# Patient Record
Sex: MALE | Race: WHITE | NOT HISPANIC OR LATINO | Employment: OTHER | URBAN - NONMETROPOLITAN AREA
[De-identification: names, ages, dates, MRNs, and addresses within clinical notes are randomized per-mention and may not be internally consistent; named-entity substitution may affect disease eponyms.]

---

## 2017-09-12 ENCOUNTER — OFFICE VISIT (OUTPATIENT)
Dept: PULMONOLOGY | Facility: CLINIC | Age: 47
End: 2017-09-12

## 2017-09-12 VITALS
WEIGHT: 218 LBS | HEIGHT: 65 IN | TEMPERATURE: 98.3 F | DIASTOLIC BLOOD PRESSURE: 82 MMHG | OXYGEN SATURATION: 98 % | HEART RATE: 80 BPM | BODY MASS INDEX: 36.32 KG/M2 | SYSTOLIC BLOOD PRESSURE: 140 MMHG

## 2017-09-12 DIAGNOSIS — R06.02 SHORTNESS OF BREATH: ICD-10-CM

## 2017-09-12 DIAGNOSIS — G47.33 OSA (OBSTRUCTIVE SLEEP APNEA): ICD-10-CM

## 2017-09-12 DIAGNOSIS — R91.1 LUNG NODULE: Primary | ICD-10-CM

## 2017-09-12 PROCEDURE — 99204 OFFICE O/P NEW MOD 45 MIN: CPT | Performed by: INTERNAL MEDICINE

## 2017-09-12 RX ORDER — HYDROCODONE BITARTRATE AND ACETAMINOPHEN 7.5; 325 MG/1; MG/1
TABLET ORAL
Refills: 0 | COMMUNITY
Start: 2017-08-20

## 2017-09-12 RX ORDER — AMLODIPINE BESYLATE 5 MG/1
TABLET ORAL
COMMUNITY
Start: 2017-09-06

## 2017-09-12 RX ORDER — LEVOTHYROXINE SODIUM 0.03 MG/1
TABLET ORAL
COMMUNITY
Start: 2017-09-06

## 2017-09-12 NOTE — PROGRESS NOTES
Subjective   Jean Paul Canseco is a 46 y.o. male who is being seen for Abnormal CT    History of Present Illness   This 46-year-old gentleman has been deferred to us by his primary care provider for evaluation of an abnormal CT scan of the chest.  Patient is accompanied by his girlfriend who also participated in the discussion.  Girlfriend tells me that for the past 5 - 6 months patient was sleeping all the day and that concerned her.  Patient tells me that he is sleepy but more than that he is fatigued and tired throughout the day.  He talk to his primary care provider about this issues and workup was initiated.  Initially chest x-ray was done with other workup, chest x-ray revealed nodular densities and hence he was sent for a CT scan of the chest which confirmed it.  He was then sent to us for further evaluation and management.  Patient denies any weight loss or change in his appetite.  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  Past Medical History:   Diagnosis Date   • Black lung    • Hypertension    • Thyroid disease      Past Surgical History:   Procedure Laterality Date   • COSMETIC SURGERY     • ELBOW ARTHROPLASTY     • FINGER SURGERY     • LEG SURGERY       Family History   Problem Relation Age of Onset   • Hypertension Mother    • Diabetes Mother    • Heart disease Mother       reports that he has never smoked. He has never used smokeless tobacco. He reports that he does not drink alcohol or use illicit drugs.  No Known Allergies      The following portions of the patient's history were reviewed and updated as appropriate: allergies, current medications, past family history, past medical history, past social history, past surgical history and problem list.    Review of Systems   Constitutional: Positive for fatigue ( Wife states he is sleeping all the time.). Negative for appetite change, chills, diaphoresis and unexpected weight change.        Loud snoring,  "witnessed apnea by girlfriend     HENT: Negative for sore throat, trouble swallowing and voice change.    Eyes: Negative for visual disturbance.   Respiratory: Positive for cough, shortness of breath and wheezing. Negative for apnea and choking.    Cardiovascular: Negative for chest pain, palpitations and leg swelling.   Gastrointestinal: Negative for abdominal pain, constipation, diarrhea, nausea and vomiting.   Endocrine: Negative for cold intolerance, heat intolerance, polydipsia, polyphagia and polyuria.   Genitourinary: Negative for difficulty urinating and dysuria.   Musculoskeletal: Positive for back pain (Pain in and around shoulder blades. ). Negative for gait problem.   Skin: Negative for rash and wound.   Neurological: Negative for syncope and light-headedness.   Hematological: Negative for adenopathy.   Psychiatric/Behavioral: Positive for sleep disturbance. Negative for agitation, behavioral problems and confusion.   All other systems reviewed and are negative.      Objective   /82 (BP Location: Left arm, Patient Position: Sitting, Cuff Size: Adult)  Pulse 80  Temp 98.3 °F (36.8 °C) (Oral)   Ht 65\" (165.1 cm)  Wt 218 lb (98.9 kg)  SpO2 98%  BMI 36.28 kg/m2  Physical Exam   Constitutional: He is oriented to person, place, and time.   HENT:   Head: Normocephalic and atraumatic.   Nose: Mucosal edema present.   Crowded oropharynx, Mallampati score 3     Eyes: EOM are normal. Pupils are equal, round, and reactive to light.   Neck: Neck supple.   Cardiovascular: Normal rate, regular rhythm and normal heart sounds.    Pulmonary/Chest: He has rhonchi.   Vesicular breath sound bilaterally with prolonged expiratory phase   Abdominal: Soft. Bowel sounds are normal.   Musculoskeletal: Normal range of motion. He exhibits no deformity.   Neurological: He is alert and oriented to person, place, and time.   Skin: Skin is warm and dry.   Psychiatric: He has a normal mood and affect. His behavior is normal. "   Nursing note and vitals reviewed.        Radiology:  No Images in the past 120 days found..    Lab Results:  No results found for any previous visit.    Assessment      ICD-10-CM ICD-9-CM   1. Lung nodule R91.1 793.11   2. ALEJANDRO (obstructive sleep apnea) G47.33 327.23                DISCUSSION:  This gentleman presents to us with an abnormal CT scan showing multiple lung nodules bilaterally.  Patient is extremely worried about cancer.  I had a long discussion with him.  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  But he tells me that these are new and his weight.  Considering this would proceed with a bronchoscopy for endobronchial look and also to obtain bronchial washing and brushing.  I have told him that our partner Dr. Stanton over perform the procedure at [facility] and I would see him again after the procedure once the pathology/cytology report is available.    Patient also has symptoms consistent with obstructive sleep apnea.  We had good discussion about that.  He would need a nocturnal polysomnography for evaluating that and he was to go for that as quick as possible.  His girlfriend his with him who tells me that he quits breathing and she is worried about that.  Would send him for a nocturnal polysomnography while waiting for his next visit.  He would also benefit from a pulmonary function test for objective assessment of his airways function considering his significant symptoms of exertional dyspnea.    Plan    Orders Placed This Encounter   Procedures   • Ambulatory Referral to Sleep Medicine     No orders of the defined types were placed in this encounter.                 Jax Boone MD, [facility], [facility]  Pulmonary, Critical Care, and Sleep Medicine

## 2017-09-21 ENCOUNTER — HOSPITAL ENCOUNTER (OUTPATIENT)
Facility: HOSPITAL | Age: 47
Setting detail: HOSPITAL OUTPATIENT SURGERY
Discharge: HOME | End: 2017-09-21
Attending: INTERNAL MEDICINE | Admitting: INTERNAL MEDICINE

## 2017-09-21 ENCOUNTER — ANESTHESIA EVENT (OUTPATIENT)
Dept: PERIOP | Facility: HOSPITAL | Age: 47
End: 2017-09-21

## 2017-09-21 ENCOUNTER — ANESTHESIA (OUTPATIENT)
Dept: PERIOP | Facility: HOSPITAL | Age: 47
End: 2017-09-21

## 2017-09-21 VITALS
RESPIRATION RATE: 20 BRPM | HEART RATE: 76 BPM | HEIGHT: 65 IN | WEIGHT: 205 LBS | SYSTOLIC BLOOD PRESSURE: 110 MMHG | BODY MASS INDEX: 34.16 KG/M2 | DIASTOLIC BLOOD PRESSURE: 44 MMHG | OXYGEN SATURATION: 93 % | TEMPERATURE: 98.7 F

## 2017-09-21 DIAGNOSIS — G47.33 OSA (OBSTRUCTIVE SLEEP APNEA): ICD-10-CM

## 2017-09-21 DIAGNOSIS — R91.1 LUNG NODULE: ICD-10-CM

## 2017-09-21 LAB
BASOPHILS # BLD AUTO: 0.06 10*3/MM3 (ref 0–0.3)
BASOPHILS NFR BLD AUTO: 0.7 % (ref 0–2)
DEPRECATED RDW RBC AUTO: 43.8 FL (ref 37–54)
EOSINOPHIL # BLD AUTO: 0.39 10*3/MM3 (ref 0–0.7)
EOSINOPHIL NFR BLD AUTO: 4.2 % (ref 0–5)
ERYTHROCYTE [DISTWIDTH] IN BLOOD BY AUTOMATED COUNT: 13.8 % (ref 11.5–14.5)
HCT VFR BLD AUTO: 44 % (ref 42–52)
HGB BLD-MCNC: 15.1 G/DL (ref 14–18)
IMM GRANULOCYTES # BLD: 0.02 10*3/MM3 (ref 0–0.03)
IMM GRANULOCYTES NFR BLD: 0.2 % (ref 0–0.5)
INR PPP: 1.01 (ref 0.9–1.1)
LYMPHOCYTES # BLD AUTO: 3.91 10*3/MM3 (ref 1–3)
LYMPHOCYTES NFR BLD AUTO: 42.4 % (ref 21–51)
MCH RBC QN AUTO: 30.1 PG (ref 27–33)
MCHC RBC AUTO-ENTMCNC: 34.3 G/DL (ref 33–37)
MCV RBC AUTO: 87.8 FL (ref 80–94)
MONOCYTES # BLD AUTO: 0.63 10*3/MM3 (ref 0.1–0.9)
MONOCYTES NFR BLD AUTO: 6.8 % (ref 0–10)
NEUTROPHILS # BLD AUTO: 4.22 10*3/MM3 (ref 1.4–6.5)
NEUTROPHILS NFR BLD AUTO: 45.7 % (ref 30–70)
PLATELET # BLD AUTO: 308 10*3/MM3 (ref 130–400)
PMV BLD AUTO: 9.5 FL (ref 6–10)
PROTHROMBIN TIME: 13.4 SECONDS (ref 11–15.4)
RBC # BLD AUTO: 5.01 10*6/MM3 (ref 4.7–6.1)
WBC NRBC COR # BLD: 9.23 10*3/MM3 (ref 4.5–12.5)

## 2017-09-21 PROCEDURE — 87102 FUNGUS ISOLATION CULTURE: CPT | Performed by: INTERNAL MEDICINE

## 2017-09-21 PROCEDURE — 25010000002 MIDAZOLAM PER 1 MG: Performed by: NURSE ANESTHETIST, CERTIFIED REGISTERED

## 2017-09-21 PROCEDURE — S0260 H&P FOR SURGERY: HCPCS | Performed by: INTERNAL MEDICINE

## 2017-09-21 PROCEDURE — 25010000002 DEXAMETHASONE PER 1 MG: Performed by: NURSE ANESTHETIST, CERTIFIED REGISTERED

## 2017-09-21 PROCEDURE — 31623 DX BRONCHOSCOPE/BRUSH: CPT | Performed by: INTERNAL MEDICINE

## 2017-09-21 PROCEDURE — 25010000002 ONDANSETRON PER 1 MG: Performed by: NURSE ANESTHETIST, CERTIFIED REGISTERED

## 2017-09-21 PROCEDURE — 25010000002 FENTANYL CITRATE (PF) 100 MCG/2ML SOLUTION: Performed by: NURSE ANESTHETIST, CERTIFIED REGISTERED

## 2017-09-21 PROCEDURE — 87116 MYCOBACTERIA CULTURE: CPT | Performed by: INTERNAL MEDICINE

## 2017-09-21 PROCEDURE — 87070 CULTURE OTHR SPECIMN AEROBIC: CPT | Performed by: INTERNAL MEDICINE

## 2017-09-21 PROCEDURE — 87206 SMEAR FLUORESCENT/ACID STAI: CPT | Performed by: INTERNAL MEDICINE

## 2017-09-21 PROCEDURE — 25010000002 PHENYLEPHRINE PER 1 ML: Performed by: NURSE ANESTHETIST, CERTIFIED REGISTERED

## 2017-09-21 PROCEDURE — 87205 SMEAR GRAM STAIN: CPT | Performed by: INTERNAL MEDICINE

## 2017-09-21 PROCEDURE — 25010000002 PROPOFOL 10 MG/ML EMULSION: Performed by: NURSE ANESTHETIST, CERTIFIED REGISTERED

## 2017-09-21 PROCEDURE — 31624 DX BRONCHOSCOPE/LAVAGE: CPT | Performed by: INTERNAL MEDICINE

## 2017-09-21 PROCEDURE — 85025 COMPLETE CBC W/AUTO DIFF WBC: CPT | Performed by: INTERNAL MEDICINE

## 2017-09-21 PROCEDURE — 31652 BRONCH EBUS SAMPLNG 1/2 NODE: CPT | Performed by: INTERNAL MEDICINE

## 2017-09-21 PROCEDURE — 85610 PROTHROMBIN TIME: CPT | Performed by: INTERNAL MEDICINE

## 2017-09-21 RX ORDER — FENTANYL CITRATE 50 UG/ML
INJECTION, SOLUTION INTRAMUSCULAR; INTRAVENOUS AS NEEDED
Status: DISCONTINUED | OUTPATIENT
Start: 2017-09-21 | End: 2017-09-21 | Stop reason: SURG

## 2017-09-21 RX ORDER — IPRATROPIUM BROMIDE AND ALBUTEROL SULFATE 2.5; .5 MG/3ML; MG/3ML
3 SOLUTION RESPIRATORY (INHALATION) ONCE AS NEEDED
Status: DISCONTINUED | OUTPATIENT
Start: 2017-09-21 | End: 2017-09-21 | Stop reason: HOSPADM

## 2017-09-21 RX ORDER — FENTANYL CITRATE 50 UG/ML
50 INJECTION, SOLUTION INTRAMUSCULAR; INTRAVENOUS
Status: DISCONTINUED | OUTPATIENT
Start: 2017-09-21 | End: 2017-09-21 | Stop reason: HOSPADM

## 2017-09-21 RX ORDER — SODIUM CHLORIDE, SODIUM LACTATE, POTASSIUM CHLORIDE, CALCIUM CHLORIDE 600; 310; 30; 20 MG/100ML; MG/100ML; MG/100ML; MG/100ML
125 INJECTION, SOLUTION INTRAVENOUS CONTINUOUS
Status: DISCONTINUED | OUTPATIENT
Start: 2017-09-21 | End: 2017-09-21 | Stop reason: HOSPADM

## 2017-09-21 RX ORDER — MIDAZOLAM HYDROCHLORIDE 1 MG/ML
INJECTION INTRAMUSCULAR; INTRAVENOUS AS NEEDED
Status: DISCONTINUED | OUTPATIENT
Start: 2017-09-21 | End: 2017-09-21 | Stop reason: SURG

## 2017-09-21 RX ORDER — ONDANSETRON 2 MG/ML
4 INJECTION INTRAMUSCULAR; INTRAVENOUS ONCE AS NEEDED
Status: DISCONTINUED | OUTPATIENT
Start: 2017-09-21 | End: 2017-09-21 | Stop reason: HOSPADM

## 2017-09-21 RX ORDER — LIDOCAINE HYDROCHLORIDE 20 MG/ML
INJECTION, SOLUTION INFILTRATION; PERINEURAL AS NEEDED
Status: DISCONTINUED | OUTPATIENT
Start: 2017-09-21 | End: 2017-09-21 | Stop reason: SURG

## 2017-09-21 RX ORDER — SODIUM CHLORIDE 0.9 % (FLUSH) 0.9 %
1-10 SYRINGE (ML) INJECTION AS NEEDED
Status: DISCONTINUED | OUTPATIENT
Start: 2017-09-21 | End: 2017-09-21 | Stop reason: HOSPADM

## 2017-09-21 RX ORDER — DEXAMETHASONE SODIUM PHOSPHATE 4 MG/ML
INJECTION, SOLUTION INTRA-ARTICULAR; INTRALESIONAL; INTRAMUSCULAR; INTRAVENOUS; SOFT TISSUE AS NEEDED
Status: DISCONTINUED | OUTPATIENT
Start: 2017-09-21 | End: 2017-09-21 | Stop reason: SURG

## 2017-09-21 RX ORDER — ONDANSETRON 2 MG/ML
INJECTION INTRAMUSCULAR; INTRAVENOUS AS NEEDED
Status: DISCONTINUED | OUTPATIENT
Start: 2017-09-21 | End: 2017-09-21 | Stop reason: SURG

## 2017-09-21 RX ORDER — PROPOFOL 10 MG/ML
VIAL (ML) INTRAVENOUS AS NEEDED
Status: DISCONTINUED | OUTPATIENT
Start: 2017-09-21 | End: 2017-09-21 | Stop reason: SURG

## 2017-09-21 RX ADMIN — MIDAZOLAM HYDROCHLORIDE 2 MG: 1 INJECTION, SOLUTION INTRAMUSCULAR; INTRAVENOUS at 10:29

## 2017-09-21 RX ADMIN — FENTANYL CITRATE 50 MCG: 50 INJECTION INTRAMUSCULAR; INTRAVENOUS at 10:45

## 2017-09-21 RX ADMIN — LIDOCAINE HYDROCHLORIDE 100 MG: 20 INJECTION, SOLUTION INFILTRATION; PERINEURAL at 10:36

## 2017-09-21 RX ADMIN — SODIUM CHLORIDE, POTASSIUM CHLORIDE, SODIUM LACTATE AND CALCIUM CHLORIDE: 600; 310; 30; 20 INJECTION, SOLUTION INTRAVENOUS at 10:31

## 2017-09-21 RX ADMIN — ONDANSETRON 4 MG: 2 INJECTION, SOLUTION INTRAMUSCULAR; INTRAVENOUS at 10:40

## 2017-09-21 RX ADMIN — DEXAMETHASONE SODIUM PHOSPHATE 4 MG: 4 INJECTION, SOLUTION INTRAMUSCULAR; INTRAVENOUS at 10:40

## 2017-09-21 RX ADMIN — PROPOFOL 200 MG: 10 INJECTION, EMULSION INTRAVENOUS at 10:36

## 2017-09-21 RX ADMIN — FENTANYL CITRATE 50 MCG: 50 INJECTION INTRAMUSCULAR; INTRAVENOUS at 10:36

## 2017-09-21 RX ADMIN — PHENYLEPHRINE HYDROCHLORIDE 100 MCG: 10 INJECTION, SOLUTION INTRAMUSCULAR; INTRAVENOUS; SUBCUTANEOUS at 11:05

## 2017-09-21 NOTE — ANESTHESIA POSTPROCEDURE EVALUATION
Patient: Jean Paul Canseco    Procedure Summary     Date Anesthesia Start Anesthesia Stop Room / Location    09/21/17 1031 1103 [facility] OR 07 / [facility] OR       Procedure Diagnosis Surgeon Provider    BRONCHOSCOPY WITH ENDOBRONCHIAL ULTRASOUND (N/A Bronchus) Lung nodule; ALEJANDRO (obstructive sleep apnea)  (Lung nodule [R91.1]; ALEJANDRO (obstructive sleep apnea) [G47.33]) MD Brandan Barcenas MD          Anesthesia Type: general  Last vitals  BP   92/50 (09/21/17 1110)    Temp   98.7 °F (37.1 °C) (09/21/17 1105)    Pulse   62 (09/21/17 1110)   Resp   20 (09/21/17 1110)    SpO2   93 % (09/21/17 1110)      Post Anesthesia Care and Evaluation    Patient location during evaluation: bedside  Patient participation: complete - patient participated  Level of consciousness: awake and alert  Pain score: 1  Pain management: adequate  Airway patency: patent  Anesthetic complications: No anesthetic complications  PONV Status: none  Cardiovascular status: acceptable  Respiratory status: acceptable  Hydration status: acceptable

## 2017-09-21 NOTE — OP NOTE
Bronchoscopy Procedure Note    Date of Operation: 9/21/2017    Pre-op Diagnosis: Abnormal CT chest with mediastinal lymphadenopathy    Post-op Diagnosis: Abnormal CT chest with mediastinal lymphadenopathy    Surgeon: Wade Stanton MD    Assistants: None    Anesthesia: Please see anesthesia report for details    Operation: Flexible fiberoptic bronchoscopy, diagnostic     Findings: None    Specimen:   Bronchial washings    Bronchial brushings off lingula    Bronchial brushings of right middle lobe    Bronchioloalveolar lavage of lingula    Bronchioloalveolar lavage of right middle lobe    Endobronchial ultrasound-guided transbronchial needle aspiration of station 7 lymph node-twice    Estimated Blood Loss: Minimal    Complications: None    Indications and History:  The patient is a 46 y.o. male with abnormal CT chest with mediastinal lymphadenopathy and bilateral pulmonary nodules.  The risks, benefits, complications, treatment options and expected outcomes were discussed with the patient.  The possibilities of reaction to medication, pulmonary aspiration, perforation of a viscus, bleeding, failure to diagnose a condition and creating a complication requiring transfusion or operation were discussed with the patient who freely signed the consent.      Description of Procedure:  The patient was seen in the Holding Room and the site of surgery properly noted/marked.  The patient was taken to endoscopy suite, identified as Jean Paul Canseco and the procedure verified as Flexible Fiberoptic Bronchoscopy.  A Time Out was held and the above information confirmed.     After the induction of topical nasopharyngeal anesthesia, the patient was positioned  and the bronchoscope was passed through the nares . The vocal cords were visualized and  2 ml 1 % lidocaine was topically placed onto the cords. The cords were normal. The scope was then passed into the trachea.      The scope was passed through the LMA.  The scope was  sequentially passed into the left main and then left upper and lower bronchi and segmental bronchi.       The scope was then withdrawn and advanced into the right main bronchus and then into the RUL, RML, and RLL bronchi and segmental bronchi.     No endo bronchial masses or lesions were seen.  Station 7 lymph node was enlarged close to 1.2 x 1 cm.  Samples-    Bronchial washings    Then the bronchoscope was wedged into the right middle lobe for a bronchioloalveolar lavage and close to 60 cc of saline was given once and close to 15 cc was aspirated back.    Then the bronchoscope was wedged into the lingula for a bronchioloalveolar lavage and close to 60 cc of saline was given once and close to 12 cc was respiratory back.    Bronchial brushings off lingula was done    Bronchial brushings of right middle lobe was done    Then the endobronchial ultrasound was introduced and mediastinal survey was done.    Station 7 lymph node was enlarged.  Endobronchial ultrasound-guided transbronchial needle aspiration of station 7 lymph node was done twice.    Patient had mild bleeding which was controlled with cold saline and local epinephrine.    Samples were sent for cytology and microbiology.  Please follow up the results  The Patient was taken to the Endoscopy Recovery area in satisfactory condition.      Findings were discussed with patient and patient's family.  Wade Stanton MD

## 2017-09-21 NOTE — ANESTHESIA PROCEDURE NOTES
Airway  Urgency: elective    Airway not difficult    General Information and Staff    Patient location during procedure: OR  CRNA: JOHAN TAYLOR    Indications and Patient Condition  Indications for airway management: airway protection    Preoxygenated: yes  MILS maintained throughout  Mask difficulty assessment: 0 - not attempted    Final Airway Details  Final airway type: supraglottic airway      Successful airway: unique  Size 5    Number of attempts at approach: 1    Additional Comments  LMA inserted without difficulty.  Head and neck maintained midline.  Lips and teeth as per preop.

## 2017-09-21 NOTE — H&P
Chief Complaint:  Here for a bronchoscopy for abnormal imaging - ct chest    Ct chest reviewed     Subjective     Interval History: no changes since the last time since was seen in office        Review of Systems:    Review of system system is negative for shortness of breath chest pain lightheadedness dizziness any numbness in any area of the body belly pain nausea vomiting diarrhea shortness of breath cough          Vital Signs  Temp:  [98 °F (36.7 °C)] 98 °F (36.7 °C)  Heart Rate:  [75] 75  Resp:  [20] 20  BP: (153)/(87) 153/87  Body mass index is 34.11 kg/(m^2).  No intake or output data in the 24 hours ending 09/21/17 1025       Physical Exam:   General- normal in appearance, not in any acute distress    HEENT- pupils equally reactive to light, normal in size, no scleral icterus    Neck-supple    Chest-respirations normal-on auscultation no wheezing no crackles    S1 and S2 normal    Abdomen-nontender nondistended bowel sounds positive    CNS-nonfocal    Extremities-no edema    Psychiatric-mood good, good eye contact, alert awake oriented     Results Review:     I reviewed the patient's new clinical results.    Results from last 7 days  Lab Units 09/21/17  0945   WBC 10*3/mm3 9.23   HEMOGLOBIN g/dL 15.1   PLATELETS 10*3/mm3 308         Lab Results   Component Value Date    INR 1.01 09/21/2017    PROTIME 13.4 09/21/2017           Invalid input(s): PROT, LABALBU      Imaging Results (last 24 hours)     ** No results found for the last 24 hours. **                    lactated ringers 125 mL/hr       Medication Review:     Assessment/Plan     Abnormal ct chest- all the medications history physical labs are reviewed.  We will do a bronchoscopy with bronchoalveolar lavage his bronchial brushings.  We'll send it for cytology and microbiology.    Informed consent taken.    Patient Active Problem List   Diagnosis Code   • Lung nodule R91.1   • ALEJANDRO (obstructive sleep apnea) G47.33               Wade Stanton  MD  09/21/17  10:25 AM

## 2017-09-21 NOTE — ANESTHESIA PREPROCEDURE EVALUATION
Anesthesia Evaluation     NPO Solid Status: > 8 hours  NPO Liquid Status: > 8 hours     Airway   Mallampati: II  TM distance: >3 FB  Neck ROM: full  no difficulty expected  Dental    (+) poor dentition    Pulmonary - normal exam   (+) sleep apnea,   Cardiovascular - normal exam    (+) hypertension,       Neuro/Psych  GI/Hepatic/Renal/Endo      Musculoskeletal     Abdominal  - normal exam   Substance History      OB/GYN          Other                                        Anesthesia Plan    ASA 3     general     intravenous induction   Anesthetic plan and risks discussed with patient.

## 2017-09-22 LAB
LAB AP CASE REPORT: NORMAL
Lab: NORMAL

## 2017-09-23 LAB
BACTERIA SPEC RESP CULT: NORMAL
GRAM STN SPEC: NORMAL

## 2017-09-28 ENCOUNTER — OFFICE VISIT (OUTPATIENT)
Dept: PULMONOLOGY | Facility: CLINIC | Age: 47
End: 2017-09-28

## 2017-09-28 VITALS
WEIGHT: 220 LBS | SYSTOLIC BLOOD PRESSURE: 148 MMHG | OXYGEN SATURATION: 98 % | TEMPERATURE: 98.1 F | BODY MASS INDEX: 36.65 KG/M2 | DIASTOLIC BLOOD PRESSURE: 84 MMHG | HEIGHT: 65 IN | HEART RATE: 76 BPM

## 2017-09-28 DIAGNOSIS — G47.33 OSA (OBSTRUCTIVE SLEEP APNEA): ICD-10-CM

## 2017-09-28 DIAGNOSIS — R91.1 LUNG NODULE: Primary | ICD-10-CM

## 2017-09-28 PROCEDURE — 99214 OFFICE O/P EST MOD 30 MIN: CPT | Performed by: INTERNAL MEDICINE

## 2017-09-28 NOTE — PROGRESS NOTES
Subjective   Jean Paul Canseco is a 46 y.o. male who is being seen for Lung Nodule    History of Present Illness   Patient returns after bronchoscopy.  This patient originally presented to us with an abnormal CT scan of the chest showing multiple O'Doul's bilaterally, the largest one was 1.8 cm size in the right lower lobe.  The rest of the nodules were between 2-6 mm in size.  Neoplastic processes was high in our differential ecchymosis, we decided to proceed with a bronchoscopy.  This was done by our partner Dr. Stanton and patient is here to discuss about the results.    Symptom wise he continues to have shortness of breath on mild exertion along with some dry cough.  No change in his symptoms since last visit.  Past Medical History:   Diagnosis Date   • Black lung    • Hypertension    • Thyroid disease      Past Surgical History:   Procedure Laterality Date   • BRONCHOSCOPY N/A 9/21/2017    Procedure: BRONCHOSCOPY WITH ENDOBRONCHIAL ULTRASOUND;  Surgeon: Wade Stanton MD;  Location: [facility];  Service:    • COSMETIC SURGERY     • ELBOW ARTHROPLASTY     • FINGER SURGERY     • LEG SURGERY       Family History   Problem Relation Age of Onset   • Hypertension Mother    • Diabetes Mother    • Heart disease Mother       reports that he has never smoked. He has never used smokeless tobacco. He reports that he does not drink alcohol or use illicit drugs.  No Known Allergies        The following portions of the patient's history were reviewed and updated as appropriate: allergies, current medications, past family history, past medical history, past social history, past surgical history and problem list.    Review of Systems   Constitutional: Negative for appetite change, chills, diaphoresis and unexpected weight change.   HENT: Negative for sore throat, trouble swallowing and voice change.    Eyes: Negative for visual disturbance.   Respiratory: Positive for cough (Occasional) and shortness of breath. Negative for apnea,  "choking and wheezing.    Cardiovascular: Negative for chest pain, palpitations and leg swelling.   Gastrointestinal: Negative for abdominal pain, constipation, diarrhea, nausea and vomiting.   Endocrine: Negative for cold intolerance, heat intolerance, polydipsia, polyphagia and polyuria.   Genitourinary: Negative for difficulty urinating and dysuria.   Musculoskeletal: Negative for gait problem.   Skin: Negative for rash and wound.   Neurological: Negative for syncope and light-headedness.   Hematological: Negative for adenopathy.   Psychiatric/Behavioral: Negative for agitation, behavioral problems and confusion.   All other systems reviewed and are negative.      Objective   /84  Pulse 76  Temp 98.1 °F (36.7 °C) (Oral)   Ht 65\" (165.1 cm)  Wt 220 lb (99.8 kg)  SpO2 98%  BMI 36.61 kg/m2  Physical Exam   Constitutional: He is oriented to person, place, and time.   HENT:   Head: Normocephalic and atraumatic.   Nose: Mucosal edema present.   Eyes: EOM are normal. Pupils are equal, round, and reactive to light.   Neck: Neck supple.   Cardiovascular: Normal rate, regular rhythm and normal heart sounds.    Pulmonary/Chest: He has rhonchi.   Vesicular breath sound bilaterally with prolonged expiratory phase   Abdominal: Soft. Bowel sounds are normal.   Musculoskeletal: Normal range of motion. He exhibits no deformity.   Neurological: He is alert and oriented to person, place, and time.   Skin: Skin is warm and dry.   Psychiatric: He has a normal mood and affect. His behavior is normal.   Nursing note and vitals reviewed.        Radiology:  No Images in the past 120 days found..    Lab Results:  Admission on 09/21/2017, Discharged on 09/21/2017   Component Date Value Ref Range Status   • Protime 09/21/2017 13.4  11.0 - 15.4 Seconds Final   • INR 09/21/2017 1.01  0.90 - 1.10 Final   • WBC 09/21/2017 9.23  4.50 - 12.50 10*3/mm3 Final   • RBC 09/21/2017 5.01  4.70 - 6.10 10*6/mm3 Final   • Hemoglobin 09/21/2017 " 15.1  14.0 - 18.0 g/dL Final   • Hematocrit 09/21/2017 44.0  42.0 - 52.0 % Final   • MCV 09/21/2017 87.8  80.0 - 94.0 fL Final   • MCH 09/21/2017 30.1  27.0 - 33.0 pg Final   • MCHC 09/21/2017 34.3  33.0 - 37.0 g/dL Final   • RDW 09/21/2017 13.8  11.5 - 14.5 % Final   • RDW-SD 09/21/2017 43.8  37.0 - 54.0 fl Final   • MPV 09/21/2017 9.5  6.0 - 10.0 fL Final   • Platelets 09/21/2017 308  130 - 400 10*3/mm3 Final   • Neutrophil % 09/21/2017 45.7  30.0 - 70.0 % Final   • Lymphocyte % 09/21/2017 42.4  21.0 - 51.0 % Final   • Monocyte % 09/21/2017 6.8  0.0 - 10.0 % Final   • Eosinophil % 09/21/2017 4.2  0.0 - 5.0 % Final   • Basophil % 09/21/2017 0.7  0.0 - 2.0 % Final   • Immature Grans % 09/21/2017 0.2  0.0 - 0.5 % Final   • Neutrophils, Absolute 09/21/2017 4.22  1.40 - 6.50 10*3/mm3 Final   • Lymphocytes, Absolute 09/21/2017 3.91* 1.00 - 3.00 10*3/mm3 Final   • Monocytes, Absolute 09/21/2017 0.63  0.10 - 0.90 10*3/mm3 Final   • Eosinophils, Absolute 09/21/2017 0.39  0.00 - 0.70 10*3/mm3 Final   • Basophils, Absolute 09/21/2017 0.06  0.00 - 0.30 10*3/mm3 Final   • Immature Grans, Absolute 09/21/2017 0.02  0.00 - 0.03 10*3/mm3 Final   • Case Report 09/21/2017    Final                    Value: [facility] Non-Gyn Cytology                           Case: [id number]                                  Authorizing Provider:  Wade Stanton MD         Collected:           09/21/2017 10:43 AM          Ordering Location:     [facility]      Received:            09/21/2017 12:52 PM                                 OPERATING ROOM DEPARTMENT                                                    Pathologist:           Lay Velasco MD                                                         Specimens:   1) - Lung, Right Middle Lobe, RML BAL                                                               2) - Lung, Right Middle Lobe, RML BRUSHING                                                          3) - Lung, Lingula, LINGULA  BAL                                                                     4) - Lung, Lingula, LINGULA BRUSHING                                                                5) - Bronchus, BRONCH WASH                                                                                                    6) - Bronchus, SUBCARINAL NEEDLE ASPIRATE                                                 • Fungus Stain 09/21/2017 Final report   Preliminary   • KOH/Calcofluor preparation 09/21/2017 Comment   Preliminary   • Fungus Stain 09/21/2017 Final report   Preliminary   • KOH/Calcofluor preparation 09/21/2017 Comment   Preliminary   • Fungus Stain 09/21/2017 Final report   Preliminary   • KOH/Calcofluor preparation 09/21/2017 Comment   Preliminary   • AFB Specimen Processing 09/21/2017 Concentration   Preliminary   • Acid Fast Smear 09/21/2017 Negative   Preliminary   • AFB Specimen Processing 09/21/2017 Concentration   Preliminary   • Acid Fast Smear 09/21/2017 Negative   Preliminary   • AFB Specimen Processing 09/21/2017 Concentration   Preliminary   • Acid Fast Smear 09/21/2017 Negative   Preliminary   • Respiratory Culture 09/21/2017 Light growth (2+) Normal Respiratory Anamika   Final   • Gram Stain Result 09/21/2017 Few (2+) WBCs seen   Final   • Gram Stain Result 09/21/2017 Rare (1+) Gram positive cocci in pairs, chains and clusters   Final   • Gram Stain Result 09/21/2017 Rare (1+) Gram positive bacilli   Final   • Gram Stain Result 09/21/2017 Rare (1+) Gram negative bacilli   Final   • Respiratory Culture 09/21/2017 Scant growth (1+) Normal Respiratory Anamika   Final   • Gram Stain Result 09/21/2017 Rare (1+) WBCs seen   Final   • Gram Stain Result 09/21/2017 No organisms seen   Final   • Respiratory Culture 09/21/2017 Light growth (2+) Normal Respiratory Anamika   Final   • Gram Stain Result 09/21/2017 Few (2+) WBCs seen   Final   • Gram Stain Result 09/21/2017 Rare (1+) Gram positive cocci in pairs, chains and clusters    Final       Assessment      ICD-10-CM ICD-9-CM   1. Lung nodule R91.1 793.11   2. ALEJANDRO (obstructive sleep apnea) G47.33 327.23                DISCUSSION:  I have reviewed the bronchoscopy report from Dr. Stanton and also reviewed the pathologist a cytology report.  No other endobronchial lesion was noted.  Bronchoalveolar lavage, bronchial brushing and bronchial washing from right middle lobe as well as lingula was negative for malignant cells.  Fine-needle aspiration from subcarinal lymph node was also negative for malignant cells.    The bronchoscopic findings are encouraging but I still cannot rule out a neoplastic process conclusively.  I discussed this with the patient and he appeared very much concerned about it.  We would proceed with a PET scan to see if the nodules, particularly the larger nodule of 1.8 cm size is hypermetabolic on not.  If we detect hypermetabolic nodule then we really need to think about neoplastic process again.  On the other hand if the metabolic activity is absent then we are really dealing with nonmalignant process, possibly granuloma or pneumoconiotic nodules.    I would see him again after the PET scan.    Plan    Orders Placed This Encounter   Procedures   • NM Pet Skull Base To Mid Thigh     No orders of the defined types were placed in this encounter.                 Jax Boone MD, FCCP, [facility]  Pulmonary, Critical Care, and Sleep Medicine

## 2017-10-10 ENCOUNTER — APPOINTMENT (OUTPATIENT)
Dept: RESPIRATORY THERAPY | Facility: HOSPITAL | Age: 47
End: 2017-10-10
Attending: INTERNAL MEDICINE

## 2017-10-13 ENCOUNTER — APPOINTMENT (OUTPATIENT)
Dept: PET IMAGING | Facility: HOSPITAL | Age: 47
End: 2017-10-13
Attending: INTERNAL MEDICINE

## 2017-10-13 ENCOUNTER — HOSPITAL ENCOUNTER (OUTPATIENT)
Dept: PET IMAGING | Facility: HOSPITAL | Age: 47
Discharge: HOME | End: 2017-10-13
Attending: INTERNAL MEDICINE | Admitting: INTERNAL MEDICINE

## 2017-10-13 DIAGNOSIS — G47.33 OSA (OBSTRUCTIVE SLEEP APNEA): ICD-10-CM

## 2017-10-13 DIAGNOSIS — R91.1 LUNG NODULE: ICD-10-CM

## 2017-10-13 PROCEDURE — 78815 PET IMAGE W/CT SKULL-THIGH: CPT | Performed by: RADIOLOGY

## 2017-10-13 PROCEDURE — A9552 F18 FDG: HCPCS | Performed by: INTERNAL MEDICINE

## 2017-10-13 PROCEDURE — 78815 PET IMAGE W/CT SKULL-THIGH: CPT

## 2017-10-13 PROCEDURE — 0 FLUDEOXYGLUCOSE F18 SOLUTION: Performed by: INTERNAL MEDICINE

## 2017-10-13 RX ADMIN — FLUDEOXYGLUCOSE F18 1 DOSE: 300 INJECTION INTRAVENOUS at 11:15

## 2017-10-20 LAB
BACTERIA SPEC AEROBE CULT: NORMAL
FUNGUS SPEC CULT: NORMAL
FUNGUS SPEC FUNGUS STN: NORMAL

## 2017-10-25 ENCOUNTER — OFFICE VISIT (OUTPATIENT)
Dept: PULMONOLOGY | Facility: CLINIC | Age: 47
End: 2017-10-25

## 2017-10-25 VITALS
OXYGEN SATURATION: 96 % | TEMPERATURE: 98.1 F | BODY MASS INDEX: 36.32 KG/M2 | SYSTOLIC BLOOD PRESSURE: 132 MMHG | HEIGHT: 65 IN | DIASTOLIC BLOOD PRESSURE: 86 MMHG | WEIGHT: 218 LBS | HEART RATE: 56 BPM

## 2017-10-25 DIAGNOSIS — R59.0 MEDIASTINAL ADENOPATHY: ICD-10-CM

## 2017-10-25 DIAGNOSIS — J60 COAL WORKERS PNEUMOCONIOSIS (HCC): ICD-10-CM

## 2017-10-25 DIAGNOSIS — G47.33 OSA (OBSTRUCTIVE SLEEP APNEA): Primary | ICD-10-CM

## 2017-10-25 DIAGNOSIS — R91.1 LUNG NODULE: ICD-10-CM

## 2017-10-25 PROCEDURE — 99214 OFFICE O/P EST MOD 30 MIN: CPT | Performed by: INTERNAL MEDICINE

## 2017-10-25 NOTE — PROGRESS NOTES
Subjective   Jean Paul Canseco is a 47 y.o. male who is being seen for Lung Nodule    History of Present Illness   Ended times after PET scan as well as a nocturnal polysomnography.  This gentleman had mediastinal adenopathy as well as lung nodule, we were following him clinically and radiologically.  A PET scan was done to see if there is any hypermetabolic activity in the nodule.  He also had symptoms of obstructive sleep apnea, a nocturnal polysomnography was done.  Patient is here to discuss about the results.    Symptom wise he still continues to have shortness of breath on mild exertion, has some dry cough times time symptoms of sleep disturbance is still persisting with fragmented sleep, unrefreshed feeling in the morning time and increased daytime fatigue and sleepiness.  Past Medical History:   Diagnosis Date   • Black lung    • Hypertension    • Thyroid disease      Past Surgical History:   Procedure Laterality Date   • BRONCHOSCOPY N/A 9/21/2017    Procedure: BRONCHOSCOPY WITH ENDOBRONCHIAL ULTRASOUND;  Surgeon: Wade Stanton MD;  Location: [facility];  Service:    • COSMETIC SURGERY     • ELBOW ARTHROPLASTY     • FINGER SURGERY     • LEG SURGERY       Family History   Problem Relation Age of Onset   • Hypertension Mother    • Diabetes Mother    • Heart disease Mother       reports that he has never smoked. He has never used smokeless tobacco. He reports that he does not drink alcohol or use illicit drugs.  No Known Allergies        The following portions of the patient's history were reviewed and updated as appropriate: allergies, current medications, past family history, past medical history, past social history, past surgical history and problem list.    Review of Systems   Constitutional: Positive for fatigue. Negative for appetite change, chills, diaphoresis and unexpected weight change.   HENT: Negative for sore throat, trouble swallowing and voice change.    Eyes: Negative for visual disturbance.  "  Respiratory: Positive for cough, shortness of breath and wheezing. Negative for apnea and choking.    Cardiovascular: Negative for chest pain, palpitations and leg swelling.   Gastrointestinal: Negative for abdominal pain, constipation, diarrhea, nausea and vomiting.   Endocrine: Negative for cold intolerance, heat intolerance, polydipsia, polyphagia and polyuria.   Genitourinary: Negative for difficulty urinating and dysuria.   Musculoskeletal: Negative for gait problem.   Skin: Negative for rash and wound.   Neurological: Negative for syncope and light-headedness.   Hematological: Negative for adenopathy.   Psychiatric/Behavioral: Negative for agitation, behavioral problems and confusion.   All other systems reviewed and are negative.      Objective   /86 (BP Location: Left arm, Patient Position: Sitting, Cuff Size: Adult)  Pulse 56  Temp 98.1 °F (36.7 °C) (Oral)   Ht 65\" (165.1 cm)  Wt 218 lb (98.9 kg)  SpO2 96%  BMI 36.28 kg/m2  Physical Exam   Constitutional: He is oriented to person, place, and time.   HENT:   Head: Normocephalic and atraumatic.   Nose: Mucosal edema present.   Eyes: EOM are normal. Pupils are equal, round, and reactive to light.   Neck: Neck supple.   Cardiovascular: Normal rate, regular rhythm and normal heart sounds.    Pulmonary/Chest: He has rhonchi.   Vesicular breath sound bilaterally with prolonged expiratory phase   Abdominal: Soft. Bowel sounds are normal.   Musculoskeletal: Normal range of motion. He exhibits no deformity.   Neurological: He is alert and oriented to person, place, and time.   Skin: Skin is warm and dry.   Psychiatric: He has a normal mood and affect. His behavior is normal.   Nursing note and vitals reviewed.        Radiology:  Nm Pet Skull Base To Mid Thigh    Result Date: 10/16/2017  The 6 mm pulmonary parenchymal lung nodule in the right upper lobe is metabolically inactive. There are at least 7 lymph nodes in the mediastinum that are in the 1+ cm " size range. They do show increased glucose uptake with SUV value readings in the 5-6 area, more prominent in the right hilum. These certainly could be reactive and can be seen with histoplasmosis. A neoplastic process could give this appearance but seems less likely.  This report was finalized on 10/16/2017 11:08 AM by Dr. Otis Pena II, MD.        Lab Results:  Admission on 09/21/2017, Discharged on 09/21/2017   Component Date Value Ref Range Status   • Protime 09/21/2017 13.4  11.0 - 15.4 Seconds Final   • INR 09/21/2017 1.01  0.90 - 1.10 Final   • WBC 09/21/2017 9.23  4.50 - 12.50 10*3/mm3 Final   • RBC 09/21/2017 5.01  4.70 - 6.10 10*6/mm3 Final   • Hemoglobin 09/21/2017 15.1  14.0 - 18.0 g/dL Final   • Hematocrit 09/21/2017 44.0  42.0 - 52.0 % Final   • MCV 09/21/2017 87.8  80.0 - 94.0 fL Final   • MCH 09/21/2017 30.1  27.0 - 33.0 pg Final   • MCHC 09/21/2017 34.3  33.0 - 37.0 g/dL Final   • RDW 09/21/2017 13.8  11.5 - 14.5 % Final   • RDW-SD 09/21/2017 43.8  37.0 - 54.0 fl Final   • MPV 09/21/2017 9.5  6.0 - 10.0 fL Final   • Platelets 09/21/2017 308  130 - 400 10*3/mm3 Final   • Neutrophil % 09/21/2017 45.7  30.0 - 70.0 % Final   • Lymphocyte % 09/21/2017 42.4  21.0 - 51.0 % Final   • Monocyte % 09/21/2017 6.8  0.0 - 10.0 % Final   • Eosinophil % 09/21/2017 4.2  0.0 - 5.0 % Final   • Basophil % 09/21/2017 0.7  0.0 - 2.0 % Final   • Immature Grans % 09/21/2017 0.2  0.0 - 0.5 % Final   • Neutrophils, Absolute 09/21/2017 4.22  1.40 - 6.50 10*3/mm3 Final   • Lymphocytes, Absolute 09/21/2017 3.91* 1.00 - 3.00 10*3/mm3 Final   • Monocytes, Absolute 09/21/2017 0.63  0.10 - 0.90 10*3/mm3 Final   • Eosinophils, Absolute 09/21/2017 0.39  0.00 - 0.70 10*3/mm3 Final   • Basophils, Absolute 09/21/2017 0.06  0.00 - 0.30 10*3/mm3 Final   • Immature Grans, Absolute 09/21/2017 0.02  0.00 - 0.03 10*3/mm3 Final   • Case Report 09/21/2017    Final                    Value:[facility] Non-Gyn Cytology                            Case: [id number]                                  Authorizing Provider:  Wade Stanton MD         Collected:           09/21/2017 10:43 AM          Ordering Location:     [facility]      Received:            09/21/2017 12:52 PM                                 OPERATING ROOM DEPARTMENT                                                    Pathologist:           Lay Velasco MD                                                         Specimens:   1) - Lung, Right Middle Lobe, RML BAL                                                               2) - Lung, Right Middle Lobe, RML BRUSHING                                                          3) - Lung, Lingula, LINGULA BAL                                                                     4) - Lung, Lingula, LINGULA BRUSHING                                                                5) - Bronchus, BRONCH WASH                                                                                                    6) - Bronchus, SUBCARINAL NEEDLE ASPIRATE                                                 • Fungus Stain 09/21/2017 Final report   Final   • KOH/Calcofluor preparation 09/21/2017 Comment   Final   • Culture 09/21/2017 Final report   Final   • Fungus (Mycology) Result 1 09/21/2017 Comment   Final   • Fungus Stain 09/21/2017 Final report   Final   • KOH/Calcofluor preparation 09/21/2017 Comment   Final   • Culture 09/21/2017 Final report   Final   • Fungus (Mycology) Result 1 09/21/2017 Comment   Final   • Fungus Stain 09/21/2017 Final report   Final   • KOH/Calcofluor preparation 09/21/2017 Comment   Final   • Culture 09/21/2017 Final report   Final   • Fungus (Mycology) Result 1 09/21/2017 Comment   Final   • AFB Specimen Processing 09/21/2017 Concentration   Preliminary   • Acid Fast Smear 09/21/2017 Negative   Preliminary   • AFB Specimen Processing 09/21/2017 Concentration   Preliminary   • Acid Fast Smear 09/21/2017 Negative   Preliminary   •  AFB Specimen Processing 09/21/2017 Concentration   Preliminary   • Acid Fast Smear 09/21/2017 Negative   Preliminary   • Respiratory Culture 09/21/2017 Light growth (2+) Normal Respiratory Anamika   Final   • Gram Stain Result 09/21/2017 Few (2+) WBCs seen   Final   • Gram Stain Result 09/21/2017 Rare (1+) Gram positive cocci in pairs, chains and clusters   Final   • Gram Stain Result 09/21/2017 Rare (1+) Gram positive bacilli   Final   • Gram Stain Result 09/21/2017 Rare (1+) Gram negative bacilli   Final   • Respiratory Culture 09/21/2017 Scant growth (1+) Normal Respiratory Anamika   Final   • Gram Stain Result 09/21/2017 Rare (1+) WBCs seen   Final   • Gram Stain Result 09/21/2017 No organisms seen   Final   • Respiratory Culture 09/21/2017 Light growth (2+) Normal Respiratory Anamika   Final   • Gram Stain Result 09/21/2017 Few (2+) WBCs seen   Final   • Gram Stain Result 09/21/2017 Rare (1+) Gram positive cocci in pairs, chains and clusters   Final       Assessment      ICD-10-CM ICD-9-CM   1. ALEJANDRO (obstructive sleep apnea) G47.33 327.23   2. [employer] pneumoconiosis J60 500   3. Lung nodule R91.1 793.11   4. Mediastinal adenopathy R59.0 785.6                DISCUSSION:  I have reviewed the PET scan.  All the lung nodules were found to be metabolically inactive.  Mediastinal lymph nodes did have some activity but the SUV range was rather low, around 5, suggestive more of a reactive nature.  I have discussed this with the patient.  He appeared very happy to hear this news.  But we'll keep an eye and would repeat his CT scan in approximately 6 months from now.    I have also reviewed his nocturnal polysomnography, this is indeed a home sleep study.  Findings were consistent with obstructive sleep apnea, apnea-hypopnea index was 24.  Patient is very was sympathetic and would benefit from pressure therapy.  We are planning on AutoPap with a pressure range of 5-15 cm water and would reevaluate him again in  approximately a month from now.    A pulmonary function test was ordered before, unfortunately he missed that appointment, we are re-scheduling that.    Plan    Orders Placed This Encounter   Procedures   • PAP Therapy     No orders of the defined types were placed in this encounter.                 Jax Boone MD, FCCP, [facility]  Pulmonary, Critical Care, and Sleep Medicine

## 2017-11-02 LAB
ACID FAST STN SPEC: NEGATIVE
BACTERIA SPEC AEROBE CULT: NEGATIVE
SPECIMEN PREPARATION: NORMAL

## (undated) DEVICE — ADAPT SWVL FIBROPTIC BRONCH

## (undated) DEVICE — SINGLE USE SUCTION VALVE MAJ-209: Brand: SINGLE USE SUCTION VALVE (STERILE)

## (undated) DEVICE — BRUSH CYTO MULTI APPL

## (undated) DEVICE — SYR LUER SLPTP 50ML

## (undated) DEVICE — GOWN,REINF,POLY,ECL,PP SLV,XL: Brand: MEDLINE

## (undated) DEVICE — SINGLE USE BIOPSY VALVE MAJ-210: Brand: SINGLE USE BIOPSY VALVE (STERILE)

## (undated) DEVICE — TRAP,MUCUS SPECIMEN,40CC: Brand: MEDLINE

## (undated) DEVICE — TUBING, SUCTION, 3/16" X 6', STRAIGHT: Brand: MEDLINE

## (undated) DEVICE — DEV NDL ASP TRNSBRNCH EXCELON 19G 15MM 130CM

## (undated) DEVICE — Device

## (undated) DEVICE — TUBING, SUCTION, 1/4" X 20', STRAIGHT: Brand: MEDLINE INDUSTRIES, INC.

## (undated) DEVICE — SUCTION CANISTER, 1500CC, RIGID: Brand: DEROYAL